# Patient Record
Sex: FEMALE | Race: WHITE | NOT HISPANIC OR LATINO | Employment: UNEMPLOYED | URBAN - METROPOLITAN AREA
[De-identification: names, ages, dates, MRNs, and addresses within clinical notes are randomized per-mention and may not be internally consistent; named-entity substitution may affect disease eponyms.]

---

## 2024-09-10 ENCOUNTER — APPOINTMENT (OUTPATIENT)
Dept: RADIOLOGY | Facility: HOSPITAL | Age: 44
End: 2024-09-10
Payer: COMMERCIAL

## 2024-09-10 ENCOUNTER — HOSPITAL ENCOUNTER (EMERGENCY)
Facility: HOSPITAL | Age: 44
Discharge: HOME | End: 2024-09-10
Payer: COMMERCIAL

## 2024-09-10 VITALS
OXYGEN SATURATION: 100 % | HEIGHT: 64 IN | TEMPERATURE: 97.2 F | HEART RATE: 81 BPM | RESPIRATION RATE: 18 BRPM | DIASTOLIC BLOOD PRESSURE: 78 MMHG | SYSTOLIC BLOOD PRESSURE: 115 MMHG | WEIGHT: 145 LBS | BODY MASS INDEX: 24.75 KG/M2

## 2024-09-10 DIAGNOSIS — M79.671 RIGHT FOOT PAIN: Primary | ICD-10-CM

## 2024-09-10 PROCEDURE — 99283 EMERGENCY DEPT VISIT LOW MDM: CPT

## 2024-09-10 PROCEDURE — 73630 X-RAY EXAM OF FOOT: CPT | Mod: RT

## 2024-09-10 PROCEDURE — 73630 X-RAY EXAM OF FOOT: CPT | Mod: RIGHT SIDE | Performed by: RADIOLOGY

## 2024-09-10 ASSESSMENT — PAIN SCALES - GENERAL: PAINLEVEL_OUTOF10: 7

## 2024-09-10 ASSESSMENT — PAIN DESCRIPTION - LOCATION: LOCATION: FOOT

## 2024-09-10 ASSESSMENT — PAIN - FUNCTIONAL ASSESSMENT: PAIN_FUNCTIONAL_ASSESSMENT: 0-10

## 2024-09-10 ASSESSMENT — PAIN DESCRIPTION - ORIENTATION: ORIENTATION: RIGHT

## 2024-09-10 ASSESSMENT — COLUMBIA-SUICIDE SEVERITY RATING SCALE - C-SSRS
1. IN THE PAST MONTH, HAVE YOU WISHED YOU WERE DEAD OR WISHED YOU COULD GO TO SLEEP AND NOT WAKE UP?: NO
6. HAVE YOU EVER DONE ANYTHING, STARTED TO DO ANYTHING, OR PREPARED TO DO ANYTHING TO END YOUR LIFE?: NO
2. HAVE YOU ACTUALLY HAD ANY THOUGHTS OF KILLING YOURSELF?: NO

## 2024-09-10 ASSESSMENT — PAIN DESCRIPTION - PAIN TYPE: TYPE: ACUTE PAIN

## 2024-09-10 ASSESSMENT — PAIN DESCRIPTION - DESCRIPTORS: DESCRIPTORS: SHARP

## 2024-09-10 NOTE — ED PROVIDER NOTES
HPI   Chief Complaint   Patient presents with    foot pain       Patient is a 44-year-old female presenting to the emergency department for evaluation of right foot pain.  Patient states the pain started yesterday while she was playing pickle ball.  She states she plays pickle ball daily.  She describes it as an aching pain at the top of her foot.  Nothing makes it better and walking makes the pain worse.  She states that a majority of the pain is in the dorsal aspect of her second and third metatarsal.  She states she feels that it is broken.  She denies any significant trauma or injury.  She denies numbness or tingling down the foot.  She denies any pain in the ankle or toes.              Patient History   History reviewed. No pertinent past medical history.  Past Surgical History:   Procedure Laterality Date    OTHER SURGICAL HISTORY  10/13/2021    Knee surgery    OTHER SURGICAL HISTORY  10/13/2021    Appendectomy    OTHER SURGICAL HISTORY  10/13/2021    Gallbladder surgery     No family history on file.  Social History     Tobacco Use    Smoking status: Not on file    Smokeless tobacco: Not on file   Substance Use Topics    Alcohol use: Not on file    Drug use: Not on file       Physical Exam   ED Triage Vitals [09/10/24 1044]   Temperature Heart Rate Resp BP   36.2 °C (97.2 °F) 81 -- 115/78      Pulse Ox Temp Source Heart Rate Source Patient Position   100 % Temporal -- --      BP Location FiO2 (%)     -- --       Physical Exam  Vitals and nursing note reviewed.     GENERAL APPEARANCE: This patient is in no acute respiratory distress. Awake and alert. Talking appropriately. Answering questions appropriately. No evidence of pressured speech.    VITAL SIGNS: As per the nurses' triage record.    HEENT: Normocephalic, atraumatic.    NECK:  full gross ROM during exam    MUSCULOSKELETAL: Tenderness to palpation along the second and third metatarsals on the dorsal aspect with no obvious swelling, deformity,  ecchymosis. 5/5 strength of the right foot with plantarflexion and dorsiflexion.  No bruising on the bottom of the foot.  Full range of motion of the right ankle and toes.  Normal sensation.     NEUROLOGICAL: Awake, alert and oriented x 3.    IMMUNOLOGICAL: No palpable lymphadenopathy or lymphatic streaking noted on visible skin.    DERM: No petechiae, rashes, or ecchymoses on visible skin    PSYCH: mood and affect appear normal.        ED Course & MDM   Diagnoses as of 09/10/24 1411   Right foot pain                 No data recorded     Serge Coma Scale Score: 15 (09/10/24 1046 : Shayna Zimmerman RN)                           Medical Decision Making  **Disclaimer parts of this chart have been completed using voice recognition software. Please excuse any errors of transcription.     Evaluated this patient independently and my supervising physician was available for consultation.    HPI: Detailed above.    Exam: A medically appropriate exam performed, outlined above, given the known history and presentation.    History obtained from: Patient    Labs/Diagnostics:  XR foot right 3+ views   Final Result   No acute osseous abnormality.        Signed by: Leroy Driscoll 9/10/2024 12:57 PM   Dictation workstation:   [id number]        EMERGENCY DEPARTMENT COURSE and DIFFERENTIAL DIAGNOSIS/MDM:  Patient is a 44-year-old female presenting to the emergency department for evaluation of right foot pain.  On physical exam vital signs stable and patient is in no acute distress.  Patient has tenderness to palpation along the dorsal aspect of the right foot along the second and third metatarsals with no obvious swelling, deformity, or ecchymosis.  She has no ecchymosis noted to the bottom of the foot therefore low suspicion for Lisfranc fracture.  She has full range of motion of the right ankle and toes with 2+ strong equal DP/PT pulses bilaterally.  She has 5/5 strength in the right foot.  X-ray of the foot ordered.  X-ray  "showed no acute osseous abnormality.  Suspect patient has a sprain or bone contusion.  Patient discharged in stable condition.  She was advised to follow-up with primary care physician as well as podiatry outpatient within the next 1 to 2 days.  She will return to the emergency department with any new or worsening symptoms.    The patient presented with a chief complaint of right foot pain. The differential diagnosis associated with this patient's presentation includes fracture, dislocation, strain.     Vitals:    Vitals:    09/10/24 1044   BP: 115/78   Pulse: 81   Temp: 36.2 °C (97.2 °F)   TempSrc: Temporal   SpO2: 100%   Weight: 65.8 kg (145 lb)   Height: 1.626 m (5' 4\")     History Limited by:    None    Independent history obtained from:    None    External records reviewed:    None    Diagnostics interpreted by me:    Xrays - see my independent interpretation in MDM    Discussions with other clinicians:    None    Chronic conditions impacting care:    None    Social determinants of health affecting care:    None    Diagnostic tests considered but not performed: None    ED Medications managed:    Medications - No data to display    Prescription drugs considered:    None    Screenings:              Procedure  Procedures     Libertad Rivas PA-C  09/10/24 1411    "

## 2024-09-10 NOTE — Clinical Note
Isatu Huynh was seen and treated in our emergency department on 9/10/2024.  She may return to work on 09/11/2024.       If you have any questions or concerns, please don't hesitate to call.      Libertad Rivas PA-C

## 2024-09-10 NOTE — DISCHARGE INSTRUCTIONS
You were seen in the emergency department today for evaluation of right foot pain.  X-ray showed no evidence of fracture or abnormalities with the bones.  Suspect this is a bone bruise or sprain.  We recommend you follow-up with primary care physician as well as podiatry outpatient.  Continue to ice, rest, elevate the foot and take Tylenol and ibuprofen for pain.  Return to the emergency department at anytime with any new or worsening symptoms.